# Patient Record
Sex: FEMALE | URBAN - METROPOLITAN AREA
[De-identification: names, ages, dates, MRNs, and addresses within clinical notes are randomized per-mention and may not be internally consistent; named-entity substitution may affect disease eponyms.]

---

## 2021-10-28 ENCOUNTER — APPOINTMENT (OUTPATIENT)
Dept: URBAN - METROPOLITAN AREA CLINIC 186 | Age: 41
Setting detail: DERMATOLOGY
End: 2021-10-28

## 2021-10-28 DIAGNOSIS — L65.9 NONSCARRING HAIR LOSS, UNSPECIFIED: ICD-10-CM

## 2021-10-28 PROCEDURE — 99203 OFFICE O/P NEW LOW 30 MIN: CPT

## 2021-10-28 PROCEDURE — OTHER TREATMENT REGIMEN: OTHER

## 2021-10-28 PROCEDURE — OTHER COUNSELING: OTHER

## 2021-10-28 PROCEDURE — OTHER ADDITIONAL NOTES: OTHER

## 2021-10-28 PROCEDURE — OTHER DIAGNOSIS COMMENT: OTHER

## 2021-10-28 PROCEDURE — OTHER SUNSCREEN TREATMENT REGIMEN: OTHER

## 2021-10-28 ASSESSMENT — LOCATION DETAILED DESCRIPTION DERM
LOCATION DETAILED: RIGHT MEDIAL FRONTAL SCALP
LOCATION DETAILED: LEFT MEDIAL FRONTAL SCALP

## 2021-10-28 ASSESSMENT — LOCATION ZONE DERM: LOCATION ZONE: SCALP

## 2021-10-28 ASSESSMENT — LOCATION SIMPLE DESCRIPTION DERM
LOCATION SIMPLE: LEFT SCALP
LOCATION SIMPLE: RIGHT SCALP

## 2021-10-28 NOTE — HPI: HAIR LOSS
Previous Labs: Yes
How Did The Hair Loss Occur?: gradual in onset
How Severe Is Your Hair Loss?: moderate
What Hair Products Do You Use?: Biolage
Additional History: Did Rogaine x 8 months every other day, 2 years ago, minimal improvement
When Were The Labs Drawn? (Drawn...): 6/21
Lab Details: Within normal limits

## 2021-10-28 NOTE — PROCEDURE: DIAGNOSIS COMMENT
Render Risk Assessment In Note?: no
Comment: Patient has Long history of iron deficiency anemia. Discussed this could be associated as well as fphl would be high on the ddx, based on the presentation and history.  Patient with semi recent bloodwork. Will attempt to get these results.  Will then work up further based on labs and also consider biopsy.  Briefly discussed rogaine, tyrone revian and prp
Detail Level: Zone

## 2021-10-28 NOTE — PROCEDURE: ADDITIONAL NOTES
Additional Notes: Discussed a variety of causes of hair loss including thyroid / anemia / genetics.  Patient has a history of of iron-deficiency anemia which may be a contributing factor.  Discouraged use of biotin as it can interfere with laboratory results and is not proven to increase hair growth.  Recommended topical minoxidil 5% foam to prevent further hair loss.  Discussed Prp and revian as a potential treatment options in the future.  Discussed biopsy if needed in the future for a definitive diagnosis
Detail Level: Simple
Render Risk Assessment In Note?: no